# Patient Record
Sex: MALE | Race: WHITE | NOT HISPANIC OR LATINO | Employment: FULL TIME | URBAN - NONMETROPOLITAN AREA
[De-identification: names, ages, dates, MRNs, and addresses within clinical notes are randomized per-mention and may not be internally consistent; named-entity substitution may affect disease eponyms.]

---

## 2018-11-21 ENCOUNTER — OFFICE VISIT (OUTPATIENT)
Dept: SURGERY | Facility: CLINIC | Age: 58
End: 2018-11-21

## 2018-11-21 VITALS
SYSTOLIC BLOOD PRESSURE: 124 MMHG | DIASTOLIC BLOOD PRESSURE: 80 MMHG | WEIGHT: 163 LBS | TEMPERATURE: 99.4 F | HEIGHT: 70 IN | HEART RATE: 64 BPM | OXYGEN SATURATION: 98 % | BODY MASS INDEX: 23.34 KG/M2

## 2018-11-21 DIAGNOSIS — L72.3 SEBACEOUS CYST: Primary | ICD-10-CM

## 2018-11-21 DIAGNOSIS — D36.10 NEUROFIBROMA: ICD-10-CM

## 2018-11-21 PROCEDURE — 99243 OFF/OP CNSLTJ NEW/EST LOW 30: CPT | Performed by: SURGERY

## 2018-11-21 NOTE — PROGRESS NOTES
Patient: Josué Monzon    YOB: 1960    Date: 11/21/2018    Primary Care Provider: Chalo Graves MD    Chief Complaint   Patient presents with   • Cyst       Subjective .     History of present illness:  Patient is here per primary care provider for evaluation of cyst on mid back which has been present for 3 years. Patient states cyst has became larger over time and started to drain one month ago a long with some discomfort.     There is a history of this above-mentioned cyst in the mid back region over the past several years.  It has become larger recently, drained recently, been tender to the touch, this discomfort as dull in nature, nonradiating in nature, not associated with any other symptomatology other than occasional drainage.    The patient's history is significant for previous neurofibroma removal from the left chest wall.  He does have a hard mass palpable in the right lower lumbar region that he wants to have evaluated also.    The following portions of the patient's history were reviewed and updated as appropriate: allergies, current medications, past family history, past medical history, past social history, past surgical history and problem list.       Review of Systems   Constitutional: Negative for chills, fever and unexpected weight change.   HENT: Negative for trouble swallowing and voice change.    Eyes: Negative for visual disturbance.   Respiratory: Negative for apnea, cough, chest tightness, shortness of breath and wheezing.    Cardiovascular: Negative for chest pain, palpitations and leg swelling.   Gastrointestinal: Negative for abdominal distention, abdominal pain, anal bleeding, blood in stool, constipation, diarrhea, nausea, rectal pain and vomiting.   Endocrine: Negative for cold intolerance and heat intolerance.   Genitourinary: Negative for difficulty urinating, dysuria, flank pain, scrotal swelling and testicular pain.   Musculoskeletal: Negative for back pain,  "gait problem and joint swelling.   Skin: Negative for color change, rash and wound.   Neurological: Negative for dizziness, syncope, speech difficulty, weakness, numbness and headaches.   Hematological: Negative for adenopathy. Does not bruise/bleed easily.   Psychiatric/Behavioral: Negative for confusion. The patient is not nervous/anxious.        Allergies:  No Known Allergies    Medications:  No current outpatient medications on file.    History\"  History reviewed. No pertinent past medical history.    Past Surgical History:   Procedure Laterality Date   • NEUROFIBROMA EXCISION         Family History   Problem Relation Age of Onset   • Cancer Mother        Social History     Tobacco Use   • Smoking status: Never Smoker   • Smokeless tobacco: Never Used   Substance Use Topics   • Alcohol use: Yes     Frequency: Monthly or less   • Drug use: No        Objective     Vital Signs:   Vitals:    11/21/18 1336   BP: 124/80   Pulse: 64   Temp: 99.4 °F (37.4 °C)   SpO2: 98%   Weight: 73.9 kg (163 lb)   Height: 176.5 cm (69.5\")       Physical Exam:   General Appearance:    Alert, cooperative, in no acute distress   Head:    Normocephalic, without obvious abnormality, atraumatic   Eyes:            Lids and lashes normal, conjunctivae and sclerae normal, no   icterus, no pallor, corneas clear, PERRLA   Ears:    Ears appear intact with no abnormalities noted   Throat:   No oral lesions, no thrush, oral mucosa moist   Neck:   No adenopathy, supple, trachea midline, no thyromegaly, no   carotid bruit, no JVD   Lungs:     Clear to auscultation,respirations regular, even and                  unlabored    Heart:    Regular rhythm and normal rate, normal S1 and S2, no            murmur, no gallop, no rub, no click   Chest Wall:    No abnormalities observed   Abdomen:     Normal bowel sounds, no masses, no organomegaly, soft        non-tender, non-distended, no guarding, no rebound                tenderness   Extremities:   Moves all " extremities well, no edema, no cyanosis, no             redness   Pulses:   Pulses palpable and equal bilaterally   Skin:   No bleeding, bruising or rash, there is evidence of a fairly large sebaceous cyst in the mid back region, there is a hard mass palpable in the right lower lumbar region    Lymph nodes:   No palpable adenopathy   Neurologic:   Cranial nerves 2 - 12 grossly intact, sensation intact, DTR       present and equal bilaterally     Results Review:   I reviewed the patient's new clinical results.  I reviewed the patient's new imaging results and agree with the interpretation.  I reviewed the patient's other test results and agree with the interpretation    Assessment/Plan     1. Sebaceous cyst    2. Neurofibroma        In short, I did have a detailed discussion with the patient in the office today and we need to schedule him for sebaceous cyst excision of the mid back region.  Risk and benefits of operative versus nonoperative intervention have been discussed with the patient, he understands and agrees, and wishes to proceed.    He may need this right lower lumbar region excised also in the future.    I discussed the patients findings and my recommendations with patient.    Review of Systems was reviewed and confirmed as accurate today.    Electronically signed by Bladimir Hernandez MD  11/21/18      .

## 2018-12-12 ENCOUNTER — PROCEDURE VISIT (OUTPATIENT)
Dept: SURGERY | Facility: CLINIC | Age: 58
End: 2018-12-12

## 2018-12-12 VITALS
DIASTOLIC BLOOD PRESSURE: 82 MMHG | HEIGHT: 69 IN | HEART RATE: 66 BPM | OXYGEN SATURATION: 98 % | SYSTOLIC BLOOD PRESSURE: 126 MMHG | TEMPERATURE: 97.8 F | BODY MASS INDEX: 25.03 KG/M2 | WEIGHT: 169 LBS

## 2018-12-12 DIAGNOSIS — R22.2 MASS OF TORSO: Primary | ICD-10-CM

## 2018-12-12 DIAGNOSIS — L72.3 SEBACEOUS CYST: Primary | ICD-10-CM

## 2018-12-12 PROCEDURE — 11404 EXC TR-EXT B9+MARG 3.1-4 CM: CPT | Performed by: SURGERY

## 2018-12-12 PROCEDURE — 11402 EXC TR-EXT B9+MARG 1.1-2 CM: CPT | Performed by: SURGERY

## 2018-12-12 PROCEDURE — 12032 INTMD RPR S/A/T/EXT 2.6-7.5: CPT | Performed by: SURGERY

## 2018-12-12 NOTE — PROGRESS NOTES
Location: midback region    Procedure:excision sebaceous cyst      I recommend excision. Procedure and the risks and benefits were explained including bleeding and infection. The patient understands these and wishes to proceed.     The patient was brought to the procedure room. Consent and time out were performed. The area was prepped and draped in the usual fashion. 1% lidocaine with epinephrine was infused locally. An ellyptical incision was made around the lesion. Full thickness excision was performed. The lesion size was 3.4 cm. The wound was closed in layers with interrupted simple vicryl and Nylon for the skin. Wound closure size was 4 cm. There were no complications and the patient tolerated the procedure well. Hemostasis was well controlled with pressure and there was minimal blood loss. Wound instructions were given.         Location:  Right upper back region    Procedure:excision sebaceous cyst      I recommend excision. Procedure and the risks and benefits were explained including bleeding and infection. The patient understands these and wishes to proceed.     The patient was brought to the procedure room. Consent and time out were performed. The area was prepped and draped in the usual fashion. 1% lidocaine with epinephrine was infused locally. An ellyptical incision was made around the lesion. Full thickness excision was performed. The lesion size was 1.1cm. The wound was closed  with interrupted simple  Nylon for the skin. Wound closure size was 1.5 cm. There were no complications and the patient tolerated the procedure well. Hemostasis was well controlled with pressure and there was minimal blood loss. Wound instructions were given.

## 2018-12-26 ENCOUNTER — OFFICE VISIT (OUTPATIENT)
Dept: SURGERY | Facility: CLINIC | Age: 58
End: 2018-12-26

## 2018-12-26 DIAGNOSIS — L72.3 SEBACEOUS CYST: Primary | ICD-10-CM

## 2018-12-26 PROCEDURE — 99213 OFFICE O/P EST LOW 20 MIN: CPT | Performed by: SURGERY

## 2018-12-26 RX ORDER — SULFAMETHOXAZOLE AND TRIMETHOPRIM 800; 160 MG/1; MG/1
1 TABLET ORAL 2 TIMES DAILY
Qty: 14 TABLET | Refills: 0 | Status: SHIPPED | OUTPATIENT
Start: 2018-12-26 | End: 2019-03-20

## 2018-12-27 NOTE — PROGRESS NOTES
Patient: Josué Monzon    YOB: 1960    Date: 12/26/2018    Primary Care Provider: Chalo Graves MD    Chief complaint:  Right lumbar mass tender in nature    History: I did see the patient in the office today as a follow-up from his previous sebaceous cyst excision.  He now has problems with a painful lump in the right lumbar region and is concerned about a right upper back mole.  This pain is fairly dull in nature, located in the right lumbar region, worse with pressure, associated with a mass, nonradiating in nature, present over the past 2 weeks.    The following portions of the patient's history were reviewed and updated as appropriate: allergies, current medications, past family history, past medical history, past social history, past surgical history and problem list.      Review of Systems   Constitutional: Negative for chills, fever and unexpected weight change.   HENT: Negative for trouble swallowing and voice change.    Eyes: Negative for visual disturbance.   Respiratory: Negative for apnea, cough, chest tightness, shortness of breath and wheezing.    Cardiovascular: Negative for chest pain, palpitations and leg swelling.   Gastrointestinal: Negative for abdominal distention, abdominal pain, anal bleeding, blood in stool, constipation, diarrhea, nausea, rectal pain and vomiting.   Endocrine: Negative for cold intolerance and heat intolerance.   Genitourinary: Negative for difficulty urinating, dysuria, flank pain, scrotal swelling and testicular pain.   Musculoskeletal: Negative for back pain, gait problem and joint swelling.   Skin: Negative for color change, rash and wound.   Neurological: Negative for dizziness, syncope, speech difficulty, weakness, numbness and headaches.   Hematological: Negative for adenopathy. Does not bruise/bleed easily.   Psychiatric/Behavioral: Negative for confusion. The patient is not nervous/anxious.        Vital Signs  There were no vitals filed for  this visit.    Allergies:  No Known Allergies    Medications:    Current Outpatient Medications:   •  sulfamethoxazole-trimethoprim (BACTRIM DS) 800-160 MG per tablet, Take 1 tablet by mouth 2 (Two) Times a Day., Disp: 14 tablet, Rfl: 0    Physical Exam:   General Appearance:    Alert, cooperative, in no acute distress   Head:    Normocephalic, without obvious abnormality, atraumatic   Lungs:     Clear to auscultation,respirations regular, even and                  unlabored    Heart:    Regular rhythm and normal rate, normal S1 and S2, no            murmur, no gallop, no rub, no click   Abdomen:     Normal bowel sounds, no masses, no organomegaly, soft        non-tender, non-distended, no guarding, no rebound                tenderness   Extremities:   Moves all extremities well, no edema, no cyanosis, no             redness   Pulses:   Pulses palpable and equal bilaterally   Skin:   No bleeding, bruising or rash, there is an ill-defined right upper back mole with irregular shape and color and borders, there is evidence of a tender area in the right lumbar subcutaneous region consistent with inflamed sebaceous cyst       Results Review:   I reviewed the patient's new clinical results.  I reviewed the patient's new imaging results and agree with the interpretation.  I reviewed the patient's other test results and agree with the interpretation     Assessment/Plan     1. Sebaceous cyst      In short, sutures were removed from the previous sebaceous cyst excision and these are doing fine.  He needs to undergo right lumbar sebaceous cyst excision and eventual right upper back mole excision.  Risk and benefits of operative versus nonoperative intervention of been discussed with the patient, he understands and agrees, and wishes to proceed.    Electronically signed by Bladimir Hernandez MD  12/27/18

## 2019-02-26 ENCOUNTER — TELEPHONE (OUTPATIENT)
Dept: SURGERY | Facility: CLINIC | Age: 59
End: 2019-02-26

## 2019-02-26 NOTE — TELEPHONE ENCOUNTER
Patient canceled appointment via televox with Dr Hernandez. Called patient no answer,left message.

## 2019-03-20 ENCOUNTER — OFFICE VISIT (OUTPATIENT)
Dept: SURGERY | Facility: CLINIC | Age: 59
End: 2019-03-20

## 2019-03-20 VITALS
HEART RATE: 76 BPM | SYSTOLIC BLOOD PRESSURE: 120 MMHG | BODY MASS INDEX: 25.62 KG/M2 | HEIGHT: 69 IN | TEMPERATURE: 98.3 F | DIASTOLIC BLOOD PRESSURE: 82 MMHG | OXYGEN SATURATION: 98 % | WEIGHT: 173 LBS

## 2019-03-20 DIAGNOSIS — L72.3 SEBACEOUS CYST: Primary | ICD-10-CM

## 2019-03-20 PROCEDURE — 99243 OFF/OP CNSLTJ NEW/EST LOW 30: CPT | Performed by: SURGERY

## 2019-03-20 NOTE — PROGRESS NOTES
Patient: Josué Monzon    YOB: 1960    Date: 03/20/2019    Primary Care Provider: Chalo Graves MD    Chief Complaint   Patient presents with   • Cyst       SUBJECTIVE:    History of present illness:  I saw the patient in the office today for an evaluation and treatment of a cyst. He complains of a cyst on his right lower back for the past couple of years, has gotten larger over time. He denies pain, redness and drainage.  This does cause him some aspects of slight discomfort, located in the right lumbar region, worse with pressure, relieved by not touching, nonradiating in nature, present over the past several years.    The following portions of the patient's history were reviewed and updated as appropriate: allergies, current medications, past family history, past medical history, past social history, past surgical history and problem list.       Review of Systems   Constitutional: Negative for chills, fever and unexpected weight change.   HENT: Negative for trouble swallowing and voice change.    Eyes: Negative for visual disturbance.   Respiratory: Negative for apnea, cough, chest tightness, shortness of breath and wheezing.    Cardiovascular: Negative for chest pain, palpitations and leg swelling.   Gastrointestinal: Negative for abdominal distention, abdominal pain, anal bleeding, blood in stool, constipation, diarrhea, nausea, rectal pain and vomiting.   Endocrine: Negative for cold intolerance and heat intolerance.   Genitourinary: Negative for difficulty urinating, dysuria, flank pain, scrotal swelling and testicular pain.   Musculoskeletal: Negative for back pain, gait problem and joint swelling.   Skin: Negative for color change, rash and wound.   Neurological: Negative for dizziness, syncope, speech difficulty, weakness, numbness and headaches.   Hematological: Negative for adenopathy. Does not bruise/bleed easily.   Psychiatric/Behavioral: Negative for confusion. The patient is  "not nervous/anxious.        Allergies:  No Known Allergies    Medications:  No current outpatient medications on file.    History:  History reviewed. No pertinent past medical history.    Past Surgical History:   Procedure Laterality Date   • NEUROFIBROMA EXCISION         Family History   Problem Relation Age of Onset   • Cancer Mother        Social History     Tobacco Use   • Smoking status: Never Smoker   • Smokeless tobacco: Never Used   Substance Use Topics   • Alcohol use: Yes     Frequency: Monthly or less   • Drug use: No        OBJECTIVE:    Vital Signs:   Vitals:    03/20/19 1540   BP: 120/82   Pulse: 76   Temp: 98.3 °F (36.8 °C)   SpO2: 98%   Weight: 78.5 kg (173 lb)   Height: 175.3 cm (69.02\")       Physical Exam:   General Appearance:    Alert, cooperative, in no acute distress   Head:    Normocephalic, without obvious abnormality, atraumatic   Eyes:            Lids and lashes normal, conjunctivae and sclerae normal, no   icterus, no pallor, corneas clear, PERRLA   Ears:    Ears appear intact with no abnormalities noted   Throat:   No oral lesions, no thrush, oral mucosa moist   Neck:   No adenopathy, supple, trachea midline, no thyromegaly, no   carotid bruit, no JVD   Lungs:     Clear to auscultation,respirations regular, even and                  unlabored    Heart:    Regular rhythm and normal rate, normal S1 and S2, no            murmur, no gallop, no rub, no click   Chest Wall:    No abnormalities observed   Abdomen:     Normal bowel sounds, no masses, no organomegaly, soft        non-tender, non-distended, no guarding, no rebound                tenderness   Extremities:   Moves all extremities well, no edema, no cyanosis, no             redness   Pulses:   Pulses palpable and equal bilaterally   Skin:   No bleeding, bruising or rash, there is evidence of a sebaceous cyst present in the right lumbar region   Lymph nodes:   No palpable adenopathy   Neurologic:   Cranial nerves 2 - 12 grossly intact, " sensation intact, DTR       present and equal bilaterally     Results Review:   I reviewed the patient's new clinical results.  I reviewed the patient's new imaging results and agree with the interpretation.  I reviewed the patient's other test results and agree with the interpretation    ASSESSMENT/PLAN:    1. Sebaceous cyst        I did have a detailed and extensive discussion with the patient in the office today.  The full risks and benefits of operative versus nonoperative intervention were discussed with the paient, they understand, agree, and wish to proceed with the surgical treatment plan of sebaceous cyst excision.    I discussed the patients findings and my recommendations with patient.    Review of Systems was reviewed and confirmed as accurate today.    Electronically signed by Bladimir Hernandez MD  03/20/19      .

## 2019-04-08 ENCOUNTER — TELEPHONE (OUTPATIENT)
Dept: SURGERY | Facility: CLINIC | Age: 59
End: 2019-04-08

## 2019-04-08 NOTE — TELEPHONE ENCOUNTER
Patient canceled his office procedure appointment via televox . Called and spoke with patient he stated he doesn't want to reschedule at this time but will call back at a later date and reschedule.

## 2019-06-08 ENCOUNTER — APPOINTMENT (OUTPATIENT)
Dept: GENERAL RADIOLOGY | Facility: HOSPITAL | Age: 59
End: 2019-06-08

## 2019-06-08 ENCOUNTER — HOSPITAL ENCOUNTER (EMERGENCY)
Facility: HOSPITAL | Age: 59
Discharge: HOME | End: 2019-06-08
Attending: EMERGENCY MEDICINE | Admitting: EMERGENCY MEDICINE

## 2019-06-08 VITALS
HEART RATE: 95 BPM | TEMPERATURE: 98.1 F | DIASTOLIC BLOOD PRESSURE: 81 MMHG | RESPIRATION RATE: 18 BRPM | WEIGHT: 173 LBS | HEIGHT: 70 IN | BODY MASS INDEX: 24.77 KG/M2 | OXYGEN SATURATION: 97 % | SYSTOLIC BLOOD PRESSURE: 153 MMHG

## 2019-06-08 DIAGNOSIS — T17.908A ASPIRATION INTO AIRWAY, INITIAL ENCOUNTER: Primary | ICD-10-CM

## 2019-06-08 LAB
ALBUMIN SERPL-MCNC: 4.3 G/DL (ref 3.5–5)
ALBUMIN/GLOB SERPL: 1.5 G/DL (ref 1–2)
ALP SERPL-CCNC: 69 U/L (ref 38–126)
ALT SERPL W P-5'-P-CCNC: 30 U/L (ref 13–69)
ANION GAP SERPL CALCULATED.3IONS-SCNC: 14.6 MMOL/L (ref 10–20)
AST SERPL-CCNC: 39 U/L (ref 15–46)
BASOPHILS # BLD AUTO: 0.06 10*3/MM3 (ref 0–0.2)
BASOPHILS NFR BLD AUTO: 0.9 % (ref 0–1.5)
BILIRUB SERPL-MCNC: 0.4 MG/DL (ref 0.2–1.3)
BUN BLD-MCNC: 19 MG/DL (ref 7–20)
BUN/CREAT SERPL: 21.1 (ref 6.3–21.9)
CALCIUM SPEC-SCNC: 9.1 MG/DL (ref 8.4–10.2)
CHLORIDE SERPL-SCNC: 103 MMOL/L (ref 98–107)
CO2 SERPL-SCNC: 26 MMOL/L (ref 26–30)
CREAT BLD-MCNC: 0.9 MG/DL (ref 0.6–1.3)
DEPRECATED RDW RBC AUTO: 41.1 FL (ref 37–54)
EOSINOPHIL # BLD AUTO: 0.18 10*3/MM3 (ref 0–0.4)
EOSINOPHIL NFR BLD AUTO: 2.6 % (ref 0.3–6.2)
ERYTHROCYTE [DISTWIDTH] IN BLOOD BY AUTOMATED COUNT: 12.7 % (ref 12.3–15.4)
GFR SERPL CREATININE-BSD FRML MDRD: 86 ML/MIN/1.73
GLOBULIN UR ELPH-MCNC: 2.9 GM/DL
GLUCOSE BLD-MCNC: 143 MG/DL (ref 74–98)
HCT VFR BLD AUTO: 51.8 % (ref 37.5–51)
HGB BLD-MCNC: 17.6 G/DL (ref 13–17.7)
IMM GRANULOCYTES # BLD AUTO: 0.02 10*3/MM3 (ref 0–0.05)
IMM GRANULOCYTES NFR BLD AUTO: 0.3 % (ref 0–0.5)
LYMPHOCYTES # BLD AUTO: 1.73 10*3/MM3 (ref 0.7–3.1)
LYMPHOCYTES NFR BLD AUTO: 24.7 % (ref 19.6–45.3)
MCH RBC QN AUTO: 29.7 PG (ref 26.6–33)
MCHC RBC AUTO-ENTMCNC: 34 G/DL (ref 31.5–35.7)
MCV RBC AUTO: 87.4 FL (ref 79–97)
MONOCYTES # BLD AUTO: 0.68 10*3/MM3 (ref 0.1–0.9)
MONOCYTES NFR BLD AUTO: 9.7 % (ref 5–12)
NEUTROPHILS # BLD AUTO: 4.34 10*3/MM3 (ref 1.7–7)
NEUTROPHILS NFR BLD AUTO: 61.8 % (ref 42.7–76)
NRBC BLD AUTO-RTO: 0 /100 WBC (ref 0–0.2)
PLATELET # BLD AUTO: 211 10*3/MM3 (ref 140–450)
PMV BLD AUTO: 11.4 FL (ref 6–12)
POTASSIUM BLD-SCNC: 3.6 MMOL/L (ref 3.5–5.1)
PROT SERPL-MCNC: 7.2 G/DL (ref 6.3–8.2)
RBC # BLD AUTO: 5.93 10*6/MM3 (ref 4.14–5.8)
SODIUM BLD-SCNC: 140 MMOL/L (ref 137–145)
TROPONIN I SERPL-MCNC: <0.012 NG/ML (ref 0–0.03)
WBC NRBC COR # BLD: 7.01 10*3/MM3 (ref 3.4–10.8)

## 2019-06-08 PROCEDURE — 93005 ELECTROCARDIOGRAM TRACING: CPT | Performed by: EMERGENCY MEDICINE

## 2019-06-08 PROCEDURE — 63710000001 PREDNISONE PER 5 MG: Performed by: EMERGENCY MEDICINE

## 2019-06-08 PROCEDURE — 80053 COMPREHEN METABOLIC PANEL: CPT | Performed by: EMERGENCY MEDICINE

## 2019-06-08 PROCEDURE — 85025 COMPLETE CBC W/AUTO DIFF WBC: CPT | Performed by: EMERGENCY MEDICINE

## 2019-06-08 PROCEDURE — 84484 ASSAY OF TROPONIN QUANT: CPT | Performed by: EMERGENCY MEDICINE

## 2019-06-08 PROCEDURE — 71046 X-RAY EXAM CHEST 2 VIEWS: CPT

## 2019-06-08 PROCEDURE — 99284 EMERGENCY DEPT VISIT MOD MDM: CPT

## 2019-06-08 PROCEDURE — 94640 AIRWAY INHALATION TREATMENT: CPT

## 2019-06-08 RX ORDER — AZITHROMYCIN 250 MG/1
TABLET, FILM COATED ORAL
Qty: 6 TABLET | Refills: 0 | OUTPATIENT
Start: 2019-06-08 | End: 2019-10-20 | Stop reason: HOSPADM

## 2019-06-08 RX ORDER — IPRATROPIUM BROMIDE AND ALBUTEROL SULFATE 2.5; .5 MG/3ML; MG/3ML
3 SOLUTION RESPIRATORY (INHALATION) ONCE
Status: COMPLETED | OUTPATIENT
Start: 2019-06-08 | End: 2019-06-08

## 2019-06-08 RX ORDER — PANTOPRAZOLE SODIUM 20 MG/1
20 TABLET, DELAYED RELEASE ORAL DAILY
Qty: 30 TABLET | Refills: 0 | Status: SHIPPED | OUTPATIENT
Start: 2019-06-08

## 2019-06-08 RX ORDER — PREDNISONE 50 MG/1
50 TABLET ORAL DAILY
Qty: 4 TABLET | Refills: 0 | OUTPATIENT
Start: 2019-06-08 | End: 2019-10-20 | Stop reason: HOSPADM

## 2019-06-08 RX ORDER — ALBUTEROL SULFATE 90 UG/1
2 AEROSOL, METERED RESPIRATORY (INHALATION) EVERY 4 HOURS PRN
Qty: 1 INHALER | Refills: 0 | Status: SHIPPED | OUTPATIENT
Start: 2019-06-08 | End: 2020-10-30

## 2019-06-08 RX ADMIN — IPRATROPIUM BROMIDE AND ALBUTEROL SULFATE 3 ML: .5; 3 SOLUTION RESPIRATORY (INHALATION) at 04:43

## 2019-06-08 RX ADMIN — PREDNISONE 60 MG: 10 TABLET ORAL at 05:02

## 2019-06-08 NOTE — ED PROVIDER NOTES
TRIAGE CHIEF COMPLAINT:     Nursing and triage notes reviewed    Chief Complaint   Patient presents with   • Aspiration      HPI: Josué Monzon is a 59 y.o. male who presents to the emergency department complaining of possible aspiration.  Patient states he has frequent reflux when he sleeps and when he woke up this evening he was nauseated had one episode of vomiting.  He states he thinks he aspirated a bit because he started coughing uncontrollably and heard some wheezing.  He states his symptoms have improved somewhat but still feels somewhat short of breath.  He no longer has any nausea or vomiting.  Denies chest pain.  Denies any abdominal pain.    REVIEW OF SYSTEMS: All other systems reviewed and are negative     PAST MEDICAL HISTORY:   Past Medical History:   Diagnosis Date   • Hyperlipidemia    • Hypertension         FAMILY HISTORY:   Family History   Problem Relation Age of Onset   • Cancer Mother         SOCIAL HISTORY:   Social History     Socioeconomic History   • Marital status:      Spouse name: Not on file   • Number of children: Not on file   • Years of education: Not on file   • Highest education level: Not on file   Tobacco Use   • Smoking status: Never Smoker   • Smokeless tobacco: Never Used   Substance and Sexual Activity   • Alcohol use: Yes     Frequency: Monthly or less     Comment: occasionally   • Drug use: No   • Sexual activity: Defer        SURGICAL HISTORY:   Past Surgical History:   Procedure Laterality Date   • NEUROFIBROMA EXCISION          CURRENT MEDICATIONS:      Medication List      You have not been prescribed any medications.        ALLERGIES: Patient has no known allergies.     PHYSICAL EXAM:   VITAL SIGNS:   Vitals:    06/08/19 0418   BP: 162/96   Pulse: 86   Resp: 18   Temp: 98.1 °F (36.7 °C)   SpO2: 94%      CONSTITUTIONAL: Awake, oriented, appears non-toxic   HENT: Atraumatic, normocephalic, oral mucosa pink and moist, airway patent.  EYES: Conjunctiva clear  NECK:  Trachea midline   CARDIOVASCULAR: Normal heart rate, Normal rhythm, No murmurs, rubs, gallops   PULMONARY/CHEST: A few faint scattered wheezes with good air movement.  No increased work of breathing.  ABDOMINAL: Non-distended, soft, non-tender - no rebound or guarding.  NEUROLOGIC: Non-focal, moving all four extremities, no gross sensory or motor deficits.   EXTREMITIES: No clubbing, cyanosis, or edema   SKIN: Warm, Dry, No erythema, No rash     ED COURSE / MEDICAL DECISION MAKING:   Josué Monzon is a 59 y.o. male who presents to the emergency department for evaluation of possible aspiration.  Patient appears well on arrival in the emergency department and is in no acute distress.  Patient breathing comfortably on room air.  There are few faint scattered wheezes but overall good air movement with no increased work of breathing.  Will obtain basic labs, chest x-ray, EKG for evaluation.  EKG was interpreted by me and reveals sinus rhythm with a rate of 80 bpm.  There are no acute ST segment or T wave changes.  This is a normal-appearing EKG. chest x-ray per my interpretation did not reveal any acute infiltrate or other acute process.  Patient's labs were largely unremarkable.  Patient felt improved after breathing treatment and steroids.  Will treat patient prophylactically at this time.  Return precautions discussed.    DECISION TO DISCHARGE/ADMIT: see ED care timeline     FINAL IMPRESSION:   1 --aspiration  2 --   3 --     Electronically signed by: Lexi Romo MD, 6/8/2019 4:37 AM       Lexi Romo MD  06/08/19 0543

## 2019-10-19 ENCOUNTER — HOSPITAL ENCOUNTER (EMERGENCY)
Facility: HOSPITAL | Age: 59
Discharge: HOME | End: 2019-10-20
Attending: STUDENT IN AN ORGANIZED HEALTH CARE EDUCATION/TRAINING PROGRAM | Admitting: STUDENT IN AN ORGANIZED HEALTH CARE EDUCATION/TRAINING PROGRAM

## 2019-10-19 ENCOUNTER — APPOINTMENT (OUTPATIENT)
Dept: GENERAL RADIOLOGY | Facility: HOSPITAL | Age: 59
End: 2019-10-19

## 2019-10-19 DIAGNOSIS — N39.0 URINARY TRACT INFECTION WITH HEMATURIA, SITE UNSPECIFIED: Primary | ICD-10-CM

## 2019-10-19 DIAGNOSIS — R93.89 CHEST X-RAY ABNORMALITY: ICD-10-CM

## 2019-10-19 DIAGNOSIS — R31.9 URINARY TRACT INFECTION WITH HEMATURIA, SITE UNSPECIFIED: Primary | ICD-10-CM

## 2019-10-19 PROCEDURE — 71046 X-RAY EXAM CHEST 2 VIEWS: CPT

## 2019-10-19 PROCEDURE — 80053 COMPREHEN METABOLIC PANEL: CPT | Performed by: STUDENT IN AN ORGANIZED HEALTH CARE EDUCATION/TRAINING PROGRAM

## 2019-10-19 PROCEDURE — 99283 EMERGENCY DEPT VISIT LOW MDM: CPT

## 2019-10-19 PROCEDURE — 87804 INFLUENZA ASSAY W/OPTIC: CPT | Performed by: STUDENT IN AN ORGANIZED HEALTH CARE EDUCATION/TRAINING PROGRAM

## 2019-10-19 PROCEDURE — 85025 COMPLETE CBC W/AUTO DIFF WBC: CPT | Performed by: STUDENT IN AN ORGANIZED HEALTH CARE EDUCATION/TRAINING PROGRAM

## 2019-10-19 PROCEDURE — 81001 URINALYSIS AUTO W/SCOPE: CPT | Performed by: STUDENT IN AN ORGANIZED HEALTH CARE EDUCATION/TRAINING PROGRAM

## 2019-10-19 RX ORDER — ACETAMINOPHEN 500 MG
1000 TABLET ORAL ONCE
Status: COMPLETED | OUTPATIENT
Start: 2019-10-19 | End: 2019-10-19

## 2019-10-19 RX ADMIN — ACETAMINOPHEN 1000 MG: 500 TABLET, FILM COATED ORAL at 23:52

## 2019-10-20 VITALS
WEIGHT: 172.6 LBS | SYSTOLIC BLOOD PRESSURE: 143 MMHG | OXYGEN SATURATION: 96 % | BODY MASS INDEX: 24.71 KG/M2 | RESPIRATION RATE: 16 BRPM | HEIGHT: 70 IN | DIASTOLIC BLOOD PRESSURE: 86 MMHG | HEART RATE: 72 BPM | TEMPERATURE: 98.4 F

## 2019-10-20 LAB
ALBUMIN SERPL-MCNC: 4.2 G/DL (ref 3.5–5.2)
ALBUMIN/GLOB SERPL: 1.1 G/DL
ALP SERPL-CCNC: 192 U/L (ref 39–117)
ALT SERPL W P-5'-P-CCNC: 71 U/L (ref 1–41)
ANION GAP SERPL CALCULATED.3IONS-SCNC: 14.3 MMOL/L (ref 5–15)
AST SERPL-CCNC: 64 U/L (ref 1–40)
BACTERIA UR QL AUTO: ABNORMAL /HPF
BASOPHILS # BLD AUTO: 0.07 10*3/MM3 (ref 0–0.2)
BASOPHILS NFR BLD AUTO: 0.6 % (ref 0–1.5)
BILIRUB SERPL-MCNC: 1.2 MG/DL (ref 0.2–1.2)
BILIRUB UR QL STRIP: NEGATIVE
BUN BLD-MCNC: 14 MG/DL (ref 6–20)
BUN/CREAT SERPL: 12.8 (ref 7–25)
CALCIUM SPEC-SCNC: 9.7 MG/DL (ref 8.6–10.5)
CHLORIDE SERPL-SCNC: 97 MMOL/L (ref 98–107)
CLARITY UR: CLEAR
CO2 SERPL-SCNC: 22.7 MMOL/L (ref 22–29)
COLOR UR: YELLOW
CREAT BLD-MCNC: 1.09 MG/DL (ref 0.76–1.27)
DEPRECATED RDW RBC AUTO: 39.6 FL (ref 37–54)
EOSINOPHIL # BLD AUTO: 0.25 10*3/MM3 (ref 0–0.4)
EOSINOPHIL NFR BLD AUTO: 2.1 % (ref 0.3–6.2)
ERYTHROCYTE [DISTWIDTH] IN BLOOD BY AUTOMATED COUNT: 12.5 % (ref 12.3–15.4)
FLUAV AG NPH QL: NEGATIVE
FLUBV AG NPH QL IA: NEGATIVE
GFR SERPL CREATININE-BSD FRML MDRD: 69 ML/MIN/1.73
GLOBULIN UR ELPH-MCNC: 3.9 GM/DL
GLUCOSE BLD-MCNC: 128 MG/DL (ref 65–99)
GLUCOSE UR STRIP-MCNC: NEGATIVE MG/DL
HCT VFR BLD AUTO: 50.3 % (ref 37.5–51)
HGB BLD-MCNC: 16.8 G/DL (ref 13–17.7)
HGB UR QL STRIP.AUTO: ABNORMAL
HYALINE CASTS UR QL AUTO: ABNORMAL /LPF
IMM GRANULOCYTES # BLD AUTO: 0.05 10*3/MM3 (ref 0–0.05)
IMM GRANULOCYTES NFR BLD AUTO: 0.4 % (ref 0–0.5)
KETONES UR QL STRIP: NEGATIVE
LEUKOCYTE ESTERASE UR QL STRIP.AUTO: NEGATIVE
LYMPHOCYTES # BLD AUTO: 1.35 10*3/MM3 (ref 0.7–3.1)
LYMPHOCYTES NFR BLD AUTO: 11.3 % (ref 19.6–45.3)
MCH RBC QN AUTO: 28.7 PG (ref 26.6–33)
MCHC RBC AUTO-ENTMCNC: 33.4 G/DL (ref 31.5–35.7)
MCV RBC AUTO: 85.8 FL (ref 79–97)
MONOCYTES # BLD AUTO: 2.15 10*3/MM3 (ref 0.1–0.9)
MONOCYTES NFR BLD AUTO: 18 % (ref 5–12)
NEUTROPHILS # BLD AUTO: 8.05 10*3/MM3 (ref 1.7–7)
NEUTROPHILS NFR BLD AUTO: 67.6 % (ref 42.7–76)
NITRITE UR QL STRIP: NEGATIVE
NRBC BLD AUTO-RTO: 0 /100 WBC (ref 0–0.2)
PH UR STRIP.AUTO: 6.5 [PH] (ref 5–8)
PLATELET # BLD AUTO: 222 10*3/MM3 (ref 140–450)
PMV BLD AUTO: 11.3 FL (ref 6–12)
POTASSIUM BLD-SCNC: 4.3 MMOL/L (ref 3.5–5.2)
PROT SERPL-MCNC: 8.1 G/DL (ref 6–8.5)
PROT UR QL STRIP: NEGATIVE
RBC # BLD AUTO: 5.86 10*6/MM3 (ref 4.14–5.8)
RBC # UR: ABNORMAL /HPF
REF LAB TEST METHOD: ABNORMAL
SODIUM BLD-SCNC: 134 MMOL/L (ref 136–145)
SP GR UR STRIP: 1.01 (ref 1–1.03)
SQUAMOUS #/AREA URNS HPF: ABNORMAL /HPF
UROBILINOGEN UR QL STRIP: ABNORMAL
WBC NRBC COR # BLD: 11.92 10*3/MM3 (ref 3.4–10.8)
WBC UR QL AUTO: ABNORMAL /HPF

## 2019-10-20 RX ORDER — LEVOFLOXACIN 750 MG/1
750 TABLET ORAL ONCE
Status: COMPLETED | OUTPATIENT
Start: 2019-10-20 | End: 2019-10-20

## 2019-10-20 RX ORDER — LEVOFLOXACIN 500 MG/1
500 TABLET, FILM COATED ORAL DAILY
Qty: 7 TABLET | Refills: 0 | Status: SHIPPED | OUTPATIENT
Start: 2019-10-20 | End: 2020-10-30

## 2019-10-20 RX ADMIN — LEVOFLOXACIN 750 MG: 750 TABLET, FILM COATED ORAL at 02:04

## 2019-10-20 NOTE — DISCHARGE INSTRUCTIONS
Follow-up as we discussed regarding the soft tissue density on your chest x-ray for further evaluation with CT scanning.

## 2019-10-20 NOTE — ED PROVIDER NOTES
Subjective   Patient is a 59-year-old male that presents to the emergency department with concerns for fever/illness that is been ongoing for the past 5 days.  Patient states he was seen at the [facility] where he had a swab for influenza that was negative.  Patient states his fevers got as high as 100.7 that he is measured.  He has been taking NyQuil tried to help with his symptoms.  Patient states he has no nasal congestion or cough.  States that he does have body aches as well as a headache and pain on his scalp.  Patient has no other definite symptoms at this time including chest pain or shortness of breath.  Patient denies nausea, vomiting or diarrhea.            Review of Systems   All other systems reviewed and are negative.      Past Medical History:   Diagnosis Date   • Hyperlipidemia    • Hypertension        No Known Allergies    Past Surgical History:   Procedure Laterality Date   • NEUROFIBROMA EXCISION         Family History   Problem Relation Age of Onset   • Cancer Mother        Social History     Socioeconomic History   • Marital status:      Spouse name: Not on file   • Number of children: Not on file   • Years of education: Not on file   • Highest education level: Not on file   Tobacco Use   • Smoking status: Never Smoker   • Smokeless tobacco: Never Used   Substance and Sexual Activity   • Alcohol use: Yes     Frequency: Monthly or less     Comment: occasionally   • Drug use: No   • Sexual activity: Defer           Objective   Physical Exam   Nursing note and vitals reviewed.      GEN: No acute distress  Head: Normocephalic, atraumatic  Eyes: Pupils equal round reactive to light  ENT: Posterior pharynx normal in appearance, oral mucosa is moist  Chest: Nontender to palpation  Cardiovascular: Mild tachycardia in the 100s  Lungs: Clear to auscultation bilaterally  Abdomen: Soft, nontender, nondistended, no peritoneal signs  Extremities: No edema, normal appearance  Neuro: GCS  15  Psych: Mood and affect are appropriate      Procedures           ED Course  ED Course as of Oct 20 0218   Sun Oct 20, 2019   0213 Chest x-ray shows a lobulated soft tissue density in the left hilum that could represent a hilar mass.  Radiology recommends CT for more detailed evaluation.  I did discuss this with the patient who states that he had a neurofibroma that he had to have removed on the left side of his chest approximately 20 years ago.  Did offer CT scanning in the emergency department tonight and the patient stated that he would rather go home and he can follow-up with his doctor and have this done as an outpatient.  [DT]      ED Course User Index  [DT] Reji Newman MD                  MDM  Number of Diagnoses or Management Options  Diagnosis management comments: Laboratories show a urinalysis that is suggestive for UTI.  Very mild leukocytosis as well as mild elevations in LFTs.  Patient was treated in the emergency department with Levaquin for UTI.       Amount and/or Complexity of Data Reviewed  Clinical lab tests: reviewed  Decide to obtain previous medical records or to obtain history from someone other than the patient: yes  Review and summarize past medical records: yes  Independent visualization of images, tracings, or specimens: yes        Final diagnoses:   Urinary tract infection with hematuria, site unspecified   Chest x-ray abnormality              Reji Newman MD  10/20/19 0213

## 2019-11-21 ENCOUNTER — TRANSCRIBE ORDERS (OUTPATIENT)
Dept: ADMINISTRATIVE | Facility: HOSPITAL | Age: 59
End: 2019-11-21

## 2019-11-21 DIAGNOSIS — R91.8 PULMONARY MASS: Primary | ICD-10-CM

## 2019-11-22 ENCOUNTER — HOSPITAL ENCOUNTER (OUTPATIENT)
Dept: CT IMAGING | Facility: HOSPITAL | Age: 59
Discharge: HOME | End: 2019-11-22
Admitting: INTERNAL MEDICINE

## 2019-11-22 DIAGNOSIS — R91.8 PULMONARY MASS: ICD-10-CM

## 2019-11-22 PROCEDURE — 71260 CT THORAX DX C+: CPT

## 2019-11-22 PROCEDURE — 25010000002 IOPAMIDOL 61 % SOLUTION: Performed by: INTERNAL MEDICINE

## 2019-11-22 RX ADMIN — IOPAMIDOL 100 ML: 612 INJECTION, SOLUTION INTRAVENOUS at 14:57

## 2020-10-30 ENCOUNTER — HOSPITAL ENCOUNTER (EMERGENCY)
Facility: HOSPITAL | Age: 60
Discharge: HOME | End: 2020-10-30
Attending: EMERGENCY MEDICINE | Admitting: EMERGENCY MEDICINE

## 2020-10-30 VITALS
TEMPERATURE: 99.6 F | HEIGHT: 69 IN | RESPIRATION RATE: 16 BRPM | OXYGEN SATURATION: 98 % | SYSTOLIC BLOOD PRESSURE: 145 MMHG | WEIGHT: 179 LBS | HEART RATE: 104 BPM | BODY MASS INDEX: 26.51 KG/M2 | DIASTOLIC BLOOD PRESSURE: 92 MMHG

## 2020-10-30 DIAGNOSIS — L72.3 INFECTED SEBACEOUS CYST: Primary | ICD-10-CM

## 2020-10-30 DIAGNOSIS — L02.91 ABSCESS: ICD-10-CM

## 2020-10-30 DIAGNOSIS — L08.9 INFECTED SEBACEOUS CYST: Primary | ICD-10-CM

## 2020-10-30 PROCEDURE — 99283 EMERGENCY DEPT VISIT LOW MDM: CPT

## 2020-10-30 RX ORDER — SULFAMETHOXAZOLE AND TRIMETHOPRIM 800; 160 MG/1; MG/1
1 TABLET ORAL 2 TIMES DAILY
Qty: 14 TABLET | Refills: 0 | Status: SHIPPED | OUTPATIENT
Start: 2020-10-30 | End: 2020-11-06

## 2020-10-30 RX ORDER — SULFAMETHOXAZOLE AND TRIMETHOPRIM 800; 160 MG/1; MG/1
1 TABLET ORAL ONCE
Status: COMPLETED | OUTPATIENT
Start: 2020-10-30 | End: 2020-10-30

## 2020-10-30 RX ORDER — LIDOCAINE HYDROCHLORIDE AND EPINEPHRINE 10; 10 MG/ML; UG/ML
10 INJECTION, SOLUTION INFILTRATION; PERINEURAL ONCE
Status: COMPLETED | OUTPATIENT
Start: 2020-10-30 | End: 2020-10-30

## 2020-10-30 RX ADMIN — LIDOCAINE HYDROCHLORIDE,EPINEPHRINE BITARTRATE 10 ML: 10; .01 INJECTION, SOLUTION INFILTRATION; PERINEURAL at 17:43

## 2020-10-30 RX ADMIN — SULFAMETHOXAZOLE AND TRIMETHOPRIM 160 MG: 800; 160 TABLET ORAL at 17:51

## 2020-11-19 NOTE — PROGRESS NOTES
Patient: Josué Monzon    YOB: 1960    Date: 11/24/2020    Primary Care Provider: Jorge Muhammad MD    Chief Complaint   Patient presents with   • Mass     lower back        SUBJECTIVE:    History of present illness: Patient has had a cyst for years in the right lower back which became inflamed about 3 weeks ago and required drainage procedure.  He still states that he has drainage.  Minimal tenderness currently.  The drainage procedure did improve his symptoms.  He still feels a mass there.      The following portions of the patient's history were reviewed and updated as appropriate: allergies, current medications, past family history, past medical history, past social history, past surgical history and problem list.      Review of Systems   Constitutional: Negative for chills, fever and unexpected weight change.   HENT: Negative for trouble swallowing and voice change.    Eyes: Negative for visual disturbance.   Respiratory: Negative for apnea, cough, chest tightness, shortness of breath and wheezing.    Cardiovascular: Negative for chest pain, palpitations and leg swelling.   Gastrointestinal: Negative for abdominal distention, abdominal pain, anal bleeding, blood in stool, constipation, diarrhea, nausea, rectal pain and vomiting.   Endocrine: Negative for cold intolerance and heat intolerance.   Genitourinary: Negative for difficulty urinating, dysuria, flank pain, scrotal swelling and testicular pain.   Musculoskeletal: Negative for back pain, gait problem and joint swelling.   Skin: Negative for color change, rash and wound.   Neurological: Negative for dizziness, syncope, speech difficulty, weakness, numbness and headaches.   Hematological: Negative for adenopathy. Does not bruise/bleed easily.   Psychiatric/Behavioral: Negative for confusion. The patient is not nervous/anxious.        Allergies:  No Known Allergies    Medications:    Current Outpatient Medications:   •  pantoprazole  "(PROTONIX) 20 MG EC tablet, Take 1 tablet by mouth Daily., Disp: 30 tablet, Rfl: 0    History:  Past Medical History:   Diagnosis Date   • GERD (gastroesophageal reflux disease)    • Hyperlipidemia    • Hypertension        Past Surgical History:   Procedure Laterality Date   • NEUROFIBROMA EXCISION         Family History   Problem Relation Age of Onset   • Cancer Mother        Social History     Tobacco Use   • Smoking status: Never Smoker   • Smokeless tobacco: Never Used   Substance Use Topics   • Alcohol use: Yes     Frequency: Monthly or less     Comment: occasionally   • Drug use: No        OBJECTIVE:    Vital Signs:   Vitals:    11/24/20 1030   BP: 126/82   Pulse: 85   Temp: 98.8 °F (37.1 °C)   SpO2: 98%   Weight: 81.6 kg (180 lb)   Height: 174 cm (68.5\")       Physical Exam:   General Appearance:    Alert, cooperative, in no acute distress   Head:    Normocephalic, without obvious abnormality, atraumatic   Eyes:            Normal.  No scleral icterus.  PERRLA    Lungs:     Clear to auscultation,respirations regular, even and                  unlabored    Heart:    Regular rhythm and normal rate, normal S1 and S2, no            murmur   Extremities:   Moves all extremities well, no edema, no cyanosis, no             redness   Skin:  Inflamed sebaceous cyst of the right lower back with some drainage.   Neurologic:   Normal without gross deficits.   Psychiatric: No evidence of depression or anxiety        Results Review:   None    Review of Systems was reviewed and confirmed as accurate as documented by the MA.    ASSESSMENT/PLAN:    1. Inflamed sebaceous cyst        I explained to the patient that unless you completely remove this cyst it will likely continue to be an issue.  I have offered to remove this for him.  He understands the procedure as well as the risks of bleeding and infection and he wishes to proceed    1% lidocaine was used locally after the area was prepped and draped in usual fashion.  An " elliptical incision was made around the lesion a full-thickness excision was performed.  It measured 3.5 cm.  Wound was then closed in layers with interrupted deep dermal Vicryl closure and a running subcuticular PDS closure.  And measured 5 to 6 cm for the closure.  Laterally it was left open for further drainage.  Patient tolerated procedure well and there were no complications.  Wound care instructions were given.    Electronically signed by Glen Arteaga MD  11/24/20

## 2020-11-24 ENCOUNTER — OFFICE VISIT (OUTPATIENT)
Dept: SURGERY | Facility: CLINIC | Age: 60
End: 2020-11-24

## 2020-11-24 VITALS
WEIGHT: 180 LBS | BODY MASS INDEX: 26.66 KG/M2 | DIASTOLIC BLOOD PRESSURE: 82 MMHG | TEMPERATURE: 98.8 F | HEIGHT: 69 IN | SYSTOLIC BLOOD PRESSURE: 126 MMHG | HEART RATE: 85 BPM | OXYGEN SATURATION: 98 %

## 2020-11-24 DIAGNOSIS — L72.3 INFLAMED SEBACEOUS CYST: Primary | ICD-10-CM

## 2020-11-24 PROCEDURE — 99203 OFFICE O/P NEW LOW 30 MIN: CPT | Performed by: SURGERY

## 2020-11-24 PROCEDURE — 12032 INTMD RPR S/A/T/EXT 2.6-7.5: CPT | Performed by: SURGERY

## 2020-11-24 PROCEDURE — 11404 EXC TR-EXT B9+MARG 3.1-4 CM: CPT | Performed by: SURGERY

## 2020-12-02 NOTE — PROGRESS NOTES
"Patient: Josué Monzon    YOB: 1960    Date: 12/04/2020    Primary Care Provider: Jorge Muhammad MD    Chief Complaint   Patient presents with   • Follow-up     Patient is here for follow up on Abcess on lower back. He states heis doing well.       History of present illness:  I saw the patient in the office today as a followup from their recent lesion excision on the lower back.  They state that they have done well and are having no problems.    Vital Signs:  Vitals:    12/04/20 0954   BP: 144/82   Pulse: 74   Temp: 96.7 °F (35.9 °C)   SpO2: 98%   Weight: 80.4 kg (177 lb 3.2 oz)   Height: 174 cm (68.5\")       Physical Exam:   General Appearance:    Alert, cooperative, in no acute distress, wound clean dry without infection       Assessment / Plan:    1. Postoperative visit      Patient doing well after excision of his inflamed sebaceous cyst.  Having no issues.  Wound care instructions were given.  Follow-up as needed..    Electronically signed by Glen Arteaga MD  12/04/20                      "

## 2020-12-04 ENCOUNTER — OFFICE VISIT (OUTPATIENT)
Dept: SURGERY | Facility: CLINIC | Age: 60
End: 2020-12-04

## 2020-12-04 VITALS
WEIGHT: 177.2 LBS | OXYGEN SATURATION: 98 % | DIASTOLIC BLOOD PRESSURE: 82 MMHG | BODY MASS INDEX: 26.25 KG/M2 | HEART RATE: 74 BPM | SYSTOLIC BLOOD PRESSURE: 144 MMHG | TEMPERATURE: 96.7 F | HEIGHT: 69 IN

## 2020-12-04 DIAGNOSIS — Z48.89 POSTOPERATIVE VISIT: Primary | ICD-10-CM

## 2020-12-04 PROCEDURE — 99024 POSTOP FOLLOW-UP VISIT: CPT | Performed by: SURGERY

## 2021-04-16 ENCOUNTER — HOSPITAL ENCOUNTER (OUTPATIENT)
Dept: GENERAL RADIOLOGY | Facility: HOSPITAL | Age: 61
Discharge: HOME | End: 2021-04-16
Admitting: NURSE PRACTITIONER

## 2021-04-16 ENCOUNTER — TRANSCRIBE ORDERS (OUTPATIENT)
Dept: GENERAL RADIOLOGY | Facility: HOSPITAL | Age: 61
End: 2021-04-16

## 2021-04-16 DIAGNOSIS — S49.91XA INJURY OF UPPER ARM, RIGHT, INITIAL ENCOUNTER: ICD-10-CM

## 2021-04-16 DIAGNOSIS — S49.91XA INJURY OF UPPER ARM, RIGHT, INITIAL ENCOUNTER: Primary | ICD-10-CM

## 2021-04-16 PROCEDURE — 73030 X-RAY EXAM OF SHOULDER: CPT

## 2022-03-29 ENCOUNTER — TELEPHONE (OUTPATIENT)
Dept: SURGERY | Facility: CLINIC | Age: 62
End: 2022-03-29

## 2022-04-06 RX ORDER — BISACODYL 5 MG
TABLET, DELAYED RELEASE (ENTERIC COATED) ORAL
Qty: 4 TABLET | Refills: 0 | Status: SHIPPED | OUTPATIENT
Start: 2022-04-06 | End: 2022-06-10 | Stop reason: HOSPADM

## 2022-04-06 RX ORDER — POLYETHYLENE GLYCOL 3350 17 G/17G
238 POWDER, FOR SOLUTION ORAL ONCE
Qty: 17 PACKET | Refills: 0 | Status: SHIPPED | OUTPATIENT
Start: 2022-04-06 | End: 2022-04-06

## 2022-04-06 NOTE — TELEPHONE ENCOUNTER
PRESCREENING FOR OPEN ACCESS SCHEDULING    Josué Monzon, 1960  [id number]    04/06/22    If, the patient answers yes to any of the following questions the provider will be informed prior to scheduling open access for approval and documented in the chart.    [x]  Yes  [] No    1. Have you ever had a colonoscopy in the past?      When:  5 years      Where:  [facility]?       Polyps or other:     [x]  Yes  [] No    2. Family history of colon cancer?      Relation: mother      Age of onset:       Do you currently have any of the following?    []  Yes  [x] No  Rectal bleeding, if so, how long?     []  Yes  [x] No  Abdominal pain, if so, how long?    []  Yes  [x] No  Constipation, if so, how long?    []  Yes  [x] No  Diarrhea, if so, how long?    []  Yes  [x] No  Weight loss, is so, how much?    [] Yes  [x] No  Small caliber stool, if so, how long?      Have you ever had any of the following conditions?    [] Yes  [x] No  Heart attack?      When?       Last cardiac workup?     Blood thinners?    [] Yes  [x] No   Lung problems, asthma or COPD?  [] Yes  [x] No  Oxygen required?       [] Yes  [x] No  Stroke?     [] Yes  [x] No  Have you ever had a reaction to anesthesia?

## 2022-04-07 ENCOUNTER — PREP FOR SURGERY (OUTPATIENT)
Dept: OTHER | Facility: HOSPITAL | Age: 62
End: 2022-04-07

## 2022-04-07 DIAGNOSIS — Z80.0 FAMILY HISTORY OF COLON CANCER REQUIRING SCREENING COLONOSCOPY: Primary | ICD-10-CM

## 2022-04-20 PROBLEM — Z80.0 FAMILY HISTORY OF COLON CANCER REQUIRING SCREENING COLONOSCOPY: Status: ACTIVE | Noted: 2022-04-20

## 2022-05-03 ENCOUNTER — TELEPHONE (OUTPATIENT)
Dept: SURGERY | Facility: CLINIC | Age: 62
End: 2022-05-03

## 2022-06-07 ENCOUNTER — TELEPHONE (OUTPATIENT)
Dept: SURGERY | Facility: CLINIC | Age: 62
End: 2022-06-07

## 2022-06-08 NOTE — PRE-PROCEDURE INSTRUCTIONS
PAT phone history completed with pt for upcoming procedure on  6/10/22 with Dr. Arteaga.    PAT PASS GIVEN/REVIEWED WITH PT.  VERBALIZED UNDERSTANDING OF THE FOLLOWING:  DO NOT EAT, DRINK, SMOKE, USE SMOKELESS TOBACCO OR CHEW GUM AFTER MIDNIGHT THE NIGHT BEFORE SURGERY.  THIS ALSO INCLUDES HARD CANDIES AND MINTS.    DO NOT SHAVE THE AREA TO BE OPERATED ON AT LEAST 48 HOURS PRIOR TO THE PROCEDURE.  DO NOT WEAR MAKE UP OR NAIL POLISH.  DO NOT LEAVE IN ANY PIERCING OR WEAR JEWELRY THE DAY OF SURGERY.      DO NOT USE ADHESIVES IF YOU WEAR DENTURES.    DO NOT WEAR EYE CONTACTS; BRING IN YOUR GLASSES.    ONLY TAKE MEDICATION THE MORNING OF YOUR PROCEDURE IF INSTRUCTED BY YOUR SURGEON WITH ENOUGH WATER TO SWALLOW THE MEDICATION.  IF YOUR SURGEON DID NOT SPECIFY WHICH MEDICATIONS TO TAKE, YOU WILL NEED TO CALL THEIR OFFICE FOR FURTHER INSTRUCTIONS AND DO AS THEY INSTRUCT.    LEAVE ANYTHING YOU CONSIDER VALUABLE AT HOME.    YOU WILL NEED TO ARRANGE FOR SOMEONE TO DRIVE YOU HOME AFTER SURGERY.  IT IS RECOMMENDED THAT YOU DO NOT DRIVE, WORK, DRINK ALCOHOL OR MAKE MAJOR DECISIONS FOR AT LEAST 24 HOURS AFTER YOUR PROCEDURE IS COMPLETE.      THE DAY OF YOUR PROCEDURE, BRING IN THE FOLLOWING IF APPLICABLE:   PICTURE ID AND INSURANCE/MEDICARE OR MEDICAID CARDS/ANY CO-PAY THAT MAY BE DUE   COPY OF ADVANCED DIRECTIVE/LIVING WILL/POWER OR    CPAP/BIPAP/INHALERS   SKIN PREP SHEET   YOUR PREADMISSION TESTING PASS (IF NOT A PHONE HISTORY)           COVID self-quarantine instructions reviewed with the pt.  Verbalized understanding.

## 2022-06-10 ENCOUNTER — HOSPITAL ENCOUNTER (OUTPATIENT)
Facility: HOSPITAL | Age: 62
Setting detail: HOSPITAL OUTPATIENT SURGERY
Discharge: HOME | End: 2022-06-10
Attending: SURGERY | Admitting: SURGERY

## 2022-06-10 ENCOUNTER — ANESTHESIA (OUTPATIENT)
Dept: GASTROENTEROLOGY | Facility: HOSPITAL | Age: 62
End: 2022-06-10

## 2022-06-10 ENCOUNTER — ANESTHESIA EVENT (OUTPATIENT)
Dept: GASTROENTEROLOGY | Facility: HOSPITAL | Age: 62
End: 2022-06-10

## 2022-06-10 VITALS
WEIGHT: 170 LBS | HEART RATE: 66 BPM | DIASTOLIC BLOOD PRESSURE: 82 MMHG | TEMPERATURE: 98.4 F | HEIGHT: 68 IN | SYSTOLIC BLOOD PRESSURE: 112 MMHG | OXYGEN SATURATION: 98 % | BODY MASS INDEX: 25.76 KG/M2 | RESPIRATION RATE: 16 BRPM

## 2022-06-10 DIAGNOSIS — Z80.0 FAMILY HISTORY OF COLON CANCER REQUIRING SCREENING COLONOSCOPY: ICD-10-CM

## 2022-06-10 PROCEDURE — 25010000002 PROPOFOL 10 MG/ML EMULSION: Performed by: NURSE ANESTHETIST, CERTIFIED REGISTERED

## 2022-06-10 PROCEDURE — S0260 H&P FOR SURGERY: HCPCS | Performed by: SURGERY

## 2022-06-10 PROCEDURE — 82962 GLUCOSE BLOOD TEST: CPT

## 2022-06-10 PROCEDURE — 45385 COLONOSCOPY W/LESION REMOVAL: CPT | Performed by: SURGERY

## 2022-06-10 RX ORDER — LIDOCAINE HYDROCHLORIDE 20 MG/ML
INJECTION, SOLUTION INTRAVENOUS AS NEEDED
Status: DISCONTINUED | OUTPATIENT
Start: 2022-06-10 | End: 2022-06-10 | Stop reason: SURG

## 2022-06-10 RX ORDER — MAGNESIUM HYDROXIDE 1200 MG/15ML
LIQUID ORAL AS NEEDED
Status: DISCONTINUED | OUTPATIENT
Start: 2022-06-10 | End: 2022-06-10 | Stop reason: HOSPADM

## 2022-06-10 RX ORDER — LISINOPRIL 10 MG/1
10 TABLET ORAL DAILY
COMMUNITY

## 2022-06-10 RX ORDER — PROPOFOL 10 MG/ML
VIAL (ML) INTRAVENOUS AS NEEDED
Status: DISCONTINUED | OUTPATIENT
Start: 2022-06-10 | End: 2022-06-10 | Stop reason: SURG

## 2022-06-10 RX ORDER — SODIUM CHLORIDE, SODIUM LACTATE, POTASSIUM CHLORIDE, CALCIUM CHLORIDE 600; 310; 30; 20 MG/100ML; MG/100ML; MG/100ML; MG/100ML
50 INJECTION, SOLUTION INTRAVENOUS CONTINUOUS
Status: DISCONTINUED | OUTPATIENT
Start: 2022-06-10 | End: 2022-06-10 | Stop reason: HOSPADM

## 2022-06-10 RX ORDER — ONDANSETRON 2 MG/ML
4 INJECTION INTRAMUSCULAR; INTRAVENOUS ONCE AS NEEDED
Status: DISCONTINUED | OUTPATIENT
Start: 2022-06-10 | End: 2022-06-10 | Stop reason: HOSPADM

## 2022-06-10 RX ADMIN — PROPOFOL 30 MG: 10 INJECTION, EMULSION INTRAVENOUS at 12:35

## 2022-06-10 RX ADMIN — PROPOFOL 70 MG: 10 INJECTION, EMULSION INTRAVENOUS at 12:28

## 2022-06-10 RX ADMIN — PROPOFOL 180 MCG/KG/MIN: 10 INJECTION, EMULSION INTRAVENOUS at 12:28

## 2022-06-10 RX ADMIN — LIDOCAINE HYDROCHLORIDE 60 MG: 20 INJECTION, SOLUTION INTRAVENOUS at 12:28

## 2022-06-10 RX ADMIN — SODIUM CHLORIDE, POTASSIUM CHLORIDE, SODIUM LACTATE AND CALCIUM CHLORIDE 50 ML/HR: 600; 310; 30; 20 INJECTION, SOLUTION INTRAVENOUS at 12:03

## 2022-06-10 RX ADMIN — PROPOFOL 30 MG: 10 INJECTION, EMULSION INTRAVENOUS at 12:41

## 2022-06-10 NOTE — H&P
[facility]   HISTORY AND PHYSICAL      Name:  Josué Monzon   Age:  62 y.o.  Sex:  male  :  1960  MRN:  [id number]   Visit Number:  [id number]  Admission Date:  6/10/2022  Date Of Service:  06/10/22  Primary Care Physician:  Jorge Muhammad MD    Chief Complaint:     I need a colonoscopy    History Of Presenting Illness:      Patient here for screening colonoscopy.  He has strong family history of colon cancer in his mother.  Last colonoscopy 8 to 10 years ago.  No GI complaints.    Review Of Systems:     General ROS: Patient denies any fevers, chills or loss of consciousness.  No complaints of generalized weakness  Psychological ROS: Denies any hallucinations and delusions.  Respiratory ROS: Denies cough or shortness of breath.   Cardiovascular ROS: Denies chest pain or palpitations. No history of exertional chest pain.   Gastrointestinal ROS: Denies nausea and vomiting. Denies any abdominal pain. No diarrhea.   Genito-Urinary ROS: Denies dysuria or hematuria.  Neurological ROS: Denies any focal weakness. No loss of consciousness. Denies any numbness.   Dermatological ROS: Denies any redness or pruritis.     Past Medical History:    Past Medical History:   Diagnosis Date   • COVID-19 vaccine series completed     with booster   • GERD (gastroesophageal reflux disease)    • Hyperlipidemia    • Hypertension    • Wears glasses        Past Surgical history:    Past Surgical History:   Procedure Laterality Date   • NEUROFIBROMA EXCISION Right        Social History:    Social History     Socioeconomic History   • Marital status:    Tobacco Use   • Smoking status: Never Smoker   • Smokeless tobacco: Never Used   Vaping Use   • Vaping Use: Never used   Substance and Sexual Activity   • Alcohol use: Yes     Comment: occasionally   • Drug use: No   • Sexual activity: Defer       Family History:    Family History   Problem Relation Age of Onset   • Cancer Mother         Allergies:      Patient has no known allergies.    Home Medications:    Prior to Admission Medications     Prescriptions Last Dose Informant Patient Reported? Taking?    bisacodyl (Dulcolax) 5 MG EC tablet 6/9/2022  No Yes    Take 2 tablets at 3 pm & 2 tablets at 7 pm day prior to colonoscopy    lisinopril (PRINIVIL,ZESTRIL) 10 MG tablet 6/9/2022  Yes Yes    Take 10 mg by mouth Daily.    pantoprazole (PROTONIX) 20 MG EC tablet 6/9/2022  No Yes    Take 1 tablet by mouth Daily.             ED Medications:    Medications   lactated ringers infusion ( Intravenous Currently Infusing 6/10/22 1221)       Vital Signs:    Temp:  [97.2 °F (36.2 °C)] 97.2 °F (36.2 °C)  Heart Rate:  [81] 81  Resp:  [18] 18  BP: (122)/(80) 122/80        06/08/22  1522   Weight: 77.1 kg (170 lb)       Body mass index is 25.85 kg/m².    Physical Exam:      General Appearance:  Alert and cooperative, not in any acute distress.   Head:  Atraumatic and normocephalic, without obvious abnormality.   Eyes:          PERRLA, conjunctivae and sclerae normal, no Icterus. No pallor. Extra-occular movements are within normal limits.   Ears:  Ears appear intact with no abnormalities noted.   Respiratory/Lungs:   Breath sounds heard bilaterally equally.  No crackles or wheezing. No Pleural rub or bronchial breathing. Normal respiratory effort.    Cardiovascular/Heart:  Normal S1 and S2,    GI/Abdomen:   No masses, no hepatosplenomegaly. Soft, non-tender, non-distended, no hernia                 Musculoskeletal/ Extremities:   Moves all extremities well   Skin: No bleeding, bruising or rash, no induration   Psychiatric : Alert and oriented ×3.  No depression or anxiety    Neurologic: Cranial nerves 2 - 12 grossly intact, sensation intact, Motor power is normal and equal bilaterally.       EKG:          Labs:    Lab Results (last 24 hours)     ** No results found for the last 24 hours. **          Radiology:    Imaging Results (Last 72 Hours)     ** No  results found for the last 72 hours. **          Assessment:    Screening colonoscopy with strong family history of colon cancer    Plan:     I recommend a screening colonoscopy to the patient.  The patient understands the procedure and the reason for the procedure.  The patient also understands the risks which include but are not limited to bleeding and perforation and they understand the ramifications of these potential complications including operative intervention and wish to proceed.    Glen Arteaga MD  06/10/22  12:27 EDT

## 2022-06-10 NOTE — DISCHARGE INSTRUCTIONS
To assist you in voiding:  Drink plenty of fluids  Listen to running water while attempting to void.    If you are unable to urinate and you have an uncomfortable urge to void or it has been   6 hours since you were discharged, return to the Emergency Room     Rest Today  No pushing, pulling, tugging,  heavy lifting, or strenuous activity.  No major decision making, driving, or drinking alcoholic beverages for 24 hours. ( due to the medications you have  received)  Always use good hand hygiene/washing techniques.  NO driving while taking pain medications.

## 2022-06-10 NOTE — ANESTHESIA PREPROCEDURE EVALUATION
Anesthesia Evaluation     Patient summary reviewed and Nursing notes reviewed   no history of anesthetic complications:  NPO Solid Status: > 8 hours  NPO Liquid Status: > 8 hours           Airway   Mallampati: II  TM distance: >3 FB  Neck ROM: full  no difficulty expected  Dental - normal exam     Pulmonary - negative pulmonary ROS and normal exam   Cardiovascular - normal exam    ECG reviewed  Rhythm: regular  Rate: normal    (+) hypertension less than 2 medications, hyperlipidemia,       Neuro/Psych- negative ROS  GI/Hepatic/Renal/Endo    (+)  GERD well controlled,      Musculoskeletal (-) negative ROS    Abdominal    Substance History - negative use     OB/GYN negative ob/gyn ROS         Other - negative ROS                       Anesthesia Plan    ASA 2     MAC     (Pt told that intravenous sedation will be used as the primary anesthetic along with local anesthesia if necessary. Every effort will be made to make sure the patient is comfortable.     The patient was told they may or may not have recall for the procedure. It was further explained that if the MAC was not adequate that a general anesthetic with either an LMA or endotracheal tube would be required.     Will proceed with the plan of care.)  intravenous induction     Anesthetic plan, risks, benefits, and alternatives have been provided, discussed and informed consent has been obtained with: patient.        CODE STATUS:

## 2022-06-11 LAB — GLUCOSE BLDC GLUCOMTR-MCNC: 97 MG/DL (ref 70–130)

## 2022-06-13 LAB — REF LAB TEST METHOD: NORMAL

## 2024-06-09 NOTE — ANESTHESIA POSTPROCEDURE EVALUATION
Patient: Josué Monzon    Procedure Summary     Date: 06/10/22 Room / Location: [facility] / [facility]    Anesthesia Start: 1221 Anesthesia Stop: 1251    Procedure: COLONOSCOPY WITH POLYPECTOMY (N/A Anus) Diagnosis:       Family history of colon cancer requiring screening colonoscopy      (Family history of colon cancer requiring screening colonoscopy [Z80.0])    Surgeons: Glen Arteaga MD Provider: Reji Reed CRNA    Anesthesia Type: MAC ASA Status: 2          Anesthesia Type: MAC    Vitals  No vitals data found for the desired time range.          Post Anesthesia Care and Evaluation    Patient location during evaluation: PHASE II  Patient participation: complete - patient participated  Level of consciousness: awake and alert  Pain score: 0  Pain management: satisfactory to patient    Airway patency: patent  Anesthetic complications: No anesthetic complications  PONV Status: none  Cardiovascular status: acceptable and stable  Respiratory status: acceptable  Hydration status: acceptable    Comments: Vitals signs as noted in nursing documentation as per protocol.       No Change

## 2025-07-25 ENCOUNTER — TRANSCRIBE ORDERS (OUTPATIENT)
Dept: LAB | Facility: HOSPITAL | Age: 65
End: 2025-07-25
Payer: COMMERCIAL

## 2025-07-25 ENCOUNTER — HOSPITAL ENCOUNTER (OUTPATIENT)
Dept: GENERAL RADIOLOGY | Facility: HOSPITAL | Age: 65
Discharge: HOME | End: 2025-07-25
Payer: COMMERCIAL

## 2025-07-25 DIAGNOSIS — G89.29 BILATERAL CHRONIC KNEE PAIN: Primary | ICD-10-CM

## 2025-07-25 DIAGNOSIS — M25.561 BILATERAL CHRONIC KNEE PAIN: ICD-10-CM

## 2025-07-25 DIAGNOSIS — G89.29 BILATERAL CHRONIC KNEE PAIN: ICD-10-CM

## 2025-07-25 DIAGNOSIS — M25.562 BILATERAL CHRONIC KNEE PAIN: Primary | ICD-10-CM

## 2025-07-25 DIAGNOSIS — M25.561 BILATERAL CHRONIC KNEE PAIN: Primary | ICD-10-CM

## 2025-07-25 DIAGNOSIS — M25.562 BILATERAL CHRONIC KNEE PAIN: ICD-10-CM

## 2025-07-25 PROCEDURE — 73562 X-RAY EXAM OF KNEE 3: CPT

## (undated) DEVICE — VLV SXN AIR/H2O ORCAPOD3 1P/U STRL

## (undated) DEVICE — QUICK CATCH IN-LINE SUCTION POLYP TRAP IS USED FOR SUCTION RETRIEVAL OF ENDOSCOPICALLY REMOVED POLYPS.

## (undated) DEVICE — HYBRID TUBING/CAP SET FOR OLYMPUS® SCOPES: Brand: ERBE

## (undated) DEVICE — Device

## (undated) DEVICE — ENDOSCOPY PORT CONNECTOR FOR OLYMPUS® SCOPES: Brand: ERBE

## (undated) DEVICE — SNAR POLYP SENSATION STDOVL 27 240 BX40